# Patient Record
(demographics unavailable — no encounter records)

---

## 2024-10-31 NOTE — REASON FOR VISIT
[Home] : at home, [unfilled] , at the time of the visit. [Other Location: e.g. Home (Enter Location, City,State)___] : at [unfilled] [Patient] : the patient [FreeTextEntry1] : Medication management for ADHD and anxiety

## 2024-10-31 NOTE — PLAN
[Medication education provided] : Medication education provided. [Rationale for medication choices, possible risks/precautions, benefits, alternative treatment choices, and consequences of non-treatment discussed] : Rationale for medication choices, possible risks/precautions, benefits, alternative treatment choices, and consequences of non-treatment discussed with patient/family/caregiver  [FreeTextEntry4] : Medication management to mitigate sx of ADHD and anxiety. [FreeTextEntry5] : Continue Concerta 27mg daily. RTC 1 month

## 2024-10-31 NOTE — SOCIAL HISTORY
[FreeTextEntry1] : Lives with girlfriend. 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 Infrequent alcohol use No other substances Non-smoker

## 2024-10-31 NOTE — FAMILY HISTORY
[FreeTextEntry1] : Sister is on medication for ADHD. Denies any family hx of cardiovascular disease.

## 2024-10-31 NOTE — HISTORY OF PRESENT ILLNESS
[FreeTextEntry1] : Pt seen in follow-up.  At last visit, in an attempt to optimize his ADHD treatment, we switched to the long-acting form of Ritalin.  Pt has been taking this for over a month, and he says it has been quite successful.  He did not experience any of the GI sx he was concerned about.  He has also noticed better effect and for much longer duration. The benefit lasts through his workday without any falling off.  He is still not sure if the current dose is optimal, but he does not was to make any increases today.   [FreeTextEntry2] : Outpatient treatment starting in 2022 No IPP No suicidal thinking or behavior [FreeTextEntry3] : Prozac - helped with anxiety but not concentration and focus Strattera - helped but cause sexual dysfunction

## 2024-12-04 NOTE — DISCUSSION/SUMMARY
[FreeTextEntry1] : Pt with ADHD and anxiety.  Better effect with extended-release medication but now having some supply issues.

## 2024-12-04 NOTE — REASON FOR VISIT
[Home] : at home, [unfilled] , at the time of the visit. [Other Location: e.g. Home (Enter Location, City,State)___] : at [unfilled] [Patient] : the patient [Other Location: e.g. School (Enter Location, City,State)___] : at [unfilled], at the time of the visit. [FreeTextEntry1] : Medication management for ADHD and anxiety

## 2024-12-04 NOTE — HISTORY OF PRESENT ILLNESS
[FreeTextEntry1] : Pt seen in follow-up.  He unfortunately had some supply issues with his medication the past, month, and just had his refill yesterday.  Pt was on vacation for a week in the interim, and he was able to space out medication to some degree, so he did not go completely without.  Definite difference in functional abilities with the medication.  We discussed alternative plans for medication supply if there are any further disruptions.  Pt thinks he might like to try a higher dose of the Concerta at next visit, to see if results might be improved.  This seems reasonable. [FreeTextEntry2] : Outpatient treatment starting in 2022 No IPP No suicidal thinking or behavior [FreeTextEntry3] : Prozac - helped with anxiety but not concentration and focus Strattera - helped but cause sexual dysfunction

## 2024-12-06 NOTE — HISTORY OF PRESENT ILLNESS
[FreeTextEntry1] : Pt seen in follow-up.   [FreeTextEntry2] : Outpatient treatment starting in 2022 No IPP No suicidal thinking or behavior [FreeTextEntry3] : Prozac - helped with anxiety but not concentration and focus Strattera - helped but cause sexual dysfunction

## 2024-12-30 NOTE — HISTORY OF PRESENT ILLNESS
[FreeTextEntry1] : Pt seen in follow-up.  Pt was able to obtain the medication and has been taking 27mg for the entire month.  This is decidedly helpful, and the long-acting formulation has been much more effective.  It still seems likely, however, that the dose is not fully optimized.  Pt would like to proceed with a dosage increase today, and this seems reasonable.  He is having no side effects at this dose.  Potential risks and benefits discussed.  [FreeTextEntry2] : Outpatient treatment starting in 2022 No IPP No suicidal thinking or behavior [FreeTextEntry3] : Prozac - helped with anxiety but not concentration and focus Strattera - helped but cause sexual dysfunction

## 2024-12-30 NOTE — REASON FOR VISIT
[Other Location: e.g. School (Enter Location, City,State)___] : at [unfilled], at the time of the visit. [Other Location: e.g. Home (Enter Location, City,State)___] : at [unfilled] [Patient] : the patient [FreeTextEntry1] : Medication management for ADHD and anxiety

## 2024-12-30 NOTE — DISCUSSION/SUMMARY
[FreeTextEntry1] : Pt with ADHD and anxiety.  Better effect with extended-release medication but dose needs to be optimized.

## 2024-12-30 NOTE — PLAN
[Medication education provided] : Medication education provided. [Rationale for medication choices, possible risks/precautions, benefits, alternative treatment choices, and consequences of non-treatment discussed] : Rationale for medication choices, possible risks/precautions, benefits, alternative treatment choices, and consequences of non-treatment discussed with patient/family/caregiver  [FreeTextEntry4] : Medication management to mitigate sx of ADHD and anxiety. [FreeTextEntry5] : Increase Concerta to 36mg daily. RTC 1 month

## 2025-01-28 NOTE — SOCIAL HISTORY
[FreeTextEntry1] : Lives with girlfriend. 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 Infrequent alcohol use No other substances Non-smoker
Yes

## 2025-01-28 NOTE — PLAN
[Medication education provided] : Medication education provided. [Rationale for medication choices, possible risks/precautions, benefits, alternative treatment choices, and consequences of non-treatment discussed] : Rationale for medication choices, possible risks/precautions, benefits, alternative treatment choices, and consequences of non-treatment discussed with patient/family/caregiver  [FreeTextEntry4] : Medication management to mitigate sx of ADHD and anxiety. [FreeTextEntry5] : Continue Concerta at 36mg daily. RTC 1 month

## 2025-01-28 NOTE — HISTORY OF PRESENT ILLNESS
[FreeTextEntry1] : Pt seen in follow-up.  At last visit, pt reported much better effect with the Concerta, though it did not seem to be fully optimized.  We raised the dose from 27 to 36mg, and he has been taking that for a month.  He has noticed a significant further improvement.  Better focus and attention.  Able to complete required tasks at work.  Denies any side effects from the increase.  Pt is very satisfied with this does and does not think any further adjustment is needed.  Pt will be moving to [place] in 5 months, and we had initial discussion about transferring his care to a new prescriber.  I suggested he look into an online service, such as [employer].  [FreeTextEntry2] : Outpatient treatment starting in 2022 No IPP No suicidal thinking or behavior [FreeTextEntry3] : Prozac - helped with anxiety but not concentration and focus Strattera - helped but cause sexual dysfunction

## 2025-02-27 NOTE — PHYSICAL EXAM
Patient already has appoint 01/06/20 [Average] : average [Cooperative] : cooperative [Euthymic] : euthymic [Full] : full [Clear] : clear [Linear/Goal Directed] : linear/goal directed [WNL] : within normal limits

## 2025-02-27 NOTE — HISTORY OF PRESENT ILLNESS
[FreeTextEntry1] : Pt seen in follow-up.   At last visit, we seemed to have hit the sweet spot for optimal effect from the Concerta.  Pt says this has continued.  Sx relief is very good, and he is functioning well at the hospital.  He denies any side effects.  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  Affect is full.  No significant anxiety sx. [FreeTextEntry2] : Outpatient treatment starting in 2022 No IPP No suicidal thinking or behavior [FreeTextEntry3] : Prozac - helped with anxiety but not concentration and focus Strattera - helped but cause sexual dysfunction

## 2025-04-02 NOTE — PHYSICAL EXAM
[Average] : average [Cooperative] : cooperative [Euthymic] : euthymic [Full] : full [Clear] : clear [Linear/Goal Directed] : linear/goal directed [WNL] : within normal limits [1 - Normal] : 1 - Normal  (not at all ill, symptoms of disorder not present past seven days) [1 - Very much improved] : 1 - Very much improved  (nearly all better; good level of functioning; minimal symptoms; represents a very substantial change)

## 2025-04-02 NOTE — PLAN
[Medication education provided] : Medication education provided. [Rationale for medication choices, possible risks/precautions, benefits, alternative treatment choices, and consequences of non-treatment discussed] : Rationale for medication choices, possible risks/precautions, benefits, alternative treatment choices, and consequences of non-treatment discussed with patient/family/caregiver  [FreeTextEntry4] : Medication management to mitigate sx of ADHD and anxiety. [FreeTextEntry5] : Continue Concerta at 36mg daily. RTC 6 weeks

## 2025-04-02 NOTE — HISTORY OF PRESENT ILLNESS
[FreeTextEntry1] : Pt seen in follow-up.   He has continued to feel well.  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  He just signed a lease for an apartment in [place]. 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  Mood has been good.  He is functioning very well at work with current Concerta dose.  He often skips it on weekend days.  No complaints at this time.  Will continue same. [FreeTextEntry2] : Outpatient treatment starting in 2022 No IPP No suicidal thinking or behavior [FreeTextEntry3] : Prozac - helped with anxiety but not concentration and focus Strattera - helped but cause sexual dysfunction

## 2025-05-14 NOTE — PLAN
[Medication education provided] : Medication education provided. [Rationale for medication choices, possible risks/precautions, benefits, alternative treatment choices, and consequences of non-treatment discussed] : Rationale for medication choices, possible risks/precautions, benefits, alternative treatment choices, and consequences of non-treatment discussed with patient/family/caregiver  [FreeTextEntry4] : Medication management to mitigate sx of ADHD and anxiety. [FreeTextEntry5] : Continue Concerta at 36mg daily. RTC 4 weeks

## 2025-05-14 NOTE — DISCUSSION/SUMMARY
[FreeTextEntry1] : Pt with ADHD and anxiety.  Doing very well with current treatment.  Preparing for move out of state.

## 2025-05-14 NOTE — HISTORY OF PRESENT ILLNESS
[FreeTextEntry1] : Pt seen in follow-up.  He is starting to pack up his apartment.  His last day of work here is 6/19, then he will be moving to [place] to continue his training.  He has Step 3 in a couple of weeks, so he is studying for that.  The medication is helping him to do so.  We will be able to meet once more before he leaves.  He has not yet looked into where he might get treatment in [place], but he plans to contact his  to see if they can provide some guidance.  We also discussed how he can get medical records released, once new treatment is secured.  Mood is good.  No significant anxiety issues.  Good focus and attention. [FreeTextEntry2] : Outpatient treatment starting in 2022 No IPP No suicidal thinking or behavior [FreeTextEntry3] : Prozac - helped with anxiety but not concentration and focus Strattera - helped but cause sexual dysfunction

## 2025-06-11 NOTE — PLAN
[Medication education provided] : Medication education provided. [Rationale for medication choices, possible risks/precautions, benefits, alternative treatment choices, and consequences of non-treatment discussed] : Rationale for medication choices, possible risks/precautions, benefits, alternative treatment choices, and consequences of non-treatment discussed with patient/family/caregiver  [FreeTextEntry4] : Medication management to mitigate sx of ADHD and anxiety. [FreeTextEntry5] : Continue Concerta at 36mg daily. Chart will be closed

## 2025-06-19 NOTE — DISCUSSION/SUMMARY
[FreeTextEntry1] : Patient attended clinic for about 10 months.  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  During his time in treatment, we were able to make several changes to his stimulant regimen that resulted in Markedly improved benefit - both in degree and duration.  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  He intends to find new treatment after he moves, once his new insurance is in place and he has located the appropriate provider.  A one-month supply of medication was given to help bridge that gap.

## 2025-06-19 NOTE — HISTORY OF PRESENT ILLNESS
[FreeTextEntry1] : I wrote the following at the time of admission to the clinic:  "Pt is 28 yo male. seeking psychiatric services primarily for ADHD. Pt brought in records from his most recent treatment, and those were reviewed with him. 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 Pt had been taking Ritalin for newly diagnosed ADHD for the past 5 months or so. He has been stretching his dwindling supply and he is seeking a new prescriber here in [place]. Pt feels the medication has been effective, though he only takes one dose of immediate release in the morning, which seems unlikely to be an optimal schedule. Pt says he has had attention problems since he was a child, but he did well enough in school that, it was never raised as a possible problem. His parents also were not very knowledgeable or understanding regarding psychiatric issues. As the difficulty of pt's education increased in college and then medical school, he became much more aware of the sx and how they were affecting his performance. He became increasingly anxious about being able to get the work done, and this is what finally led him to get an evaluation in 2022. The anxiety was initially thought to be primary, and he was started on Prozac. He did become less anxious with this medication, but there was no improvement in his focus. He was then switched to Strattera, which provided some benefit with focus, but it was stopped due to persisting sexual side effects. He was then switched to the Ritalin, and the effect was superior to the Strattera - much better focus, ability to complete tasks and meet deadlines, etc. He denies having any side effects with it, aside from a slight decrease in appetite. Pt notes that the anxiety diminished significantly with the Ritalin, so it is not clear if the anxiety issue actually requires specific targeted treatment as well. He currently takes Hydroxyzine as a prn, but rarely and mostly for sleep. Current functional demands of internship are obviously quite high, and potentially quite anxiety-provoking - even if the ADHD is well-treated, so it is not clear if current regimen will suffice going forward. For today, we will continue it, but back to daily use rather than stretching it. We get a better idea at next visit if any adjustment will need to be made. Pt was agreeable to this approach."   [FreeTextEntry2] : Outpatient treatment starting in 2022 No IPP No suicidal thinking or behavior.   [FreeTextEntry3] : Prozac - helped with anxiety but not concentration and focus Strattera - helped but cause sexual dysfunction.

## 2025-06-19 NOTE — HISTORY OF PRESENT ILLNESS
[FreeTextEntry1] : I wrote the following at the time of admission to the clinic:  "Pt is 30 yo male. seeking psychiatric services primarily for ADHD. Pt brought in records from his most recent treatment, and those were reviewed with him. 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 Pt had been taking Ritalin for newly diagnosed ADHD for the past 5 months or so. He has been stretching his dwindling supply and he is seeking a new prescriber here in [place]. Pt feels the medication has been effective, though he only takes one dose of immediate release in the morning, which seems unlikely to be an optimal schedule. Pt says he has had attention problems since he was a child, but he did well enough in school that, it was never raised as a possible problem. His parents also were not very knowledgeable or understanding regarding psychiatric issues. As the difficulty of pt's education increased in college and then medical school, he became much more aware of the sx and how they were affecting his performance. He became increasingly anxious about being able to get the work done, and this is what finally led him to get an evaluation in 2022. The anxiety was initially thought to be primary, and he was started on Prozac. He did become less anxious with this medication, but there was no improvement in his focus. He was then switched to Strattera, which provided some benefit with focus, but it was stopped due to persisting sexual side effects. He was then switched to the Ritalin, and the effect was superior to the Strattera - much better focus, ability to complete tasks and meet deadlines, etc. He denies having any side effects with it, aside from a slight decrease in appetite. Pt notes that the anxiety diminished significantly with the Ritalin, so it is not clear if the anxiety issue actually requires specific targeted treatment as well. He currently takes Hydroxyzine as a prn, but rarely and mostly for sleep. Current functional demands of internship are obviously quite high, and potentially quite anxiety-provoking - even if the ADHD is well-treated, so it is not clear if current regimen will suffice going forward. For today, we will continue it, but back to daily use rather than stretching it. We get a better idea at next visit if any adjustment will need to be made. Pt was agreeable to this approach."   [FreeTextEntry2] : Outpatient treatment starting in 2022 No IPP No suicidal thinking or behavior.   [FreeTextEntry3] : Prozac - helped with anxiety but not concentration and focus Strattera - helped but cause sexual dysfunction.

## 2025-06-19 NOTE — REASON FOR VISIT
[Other Location: e.g. School (Enter Location, City,State)___] : at [unfilled], at the time of the visit. [Other Location: e.g. Home (Enter Location, City,State)___] : at [unfilled] [Patient] : the patient [FreeTextEntry1] : Medication management for ADHD and anxiety [Patient relocated] : Patient relocated [FreeTextEntry9] : 8/20/24 [FreeTextEntry8] : 6/19/25 [FreeTextEntry2] : ADHD and anxiety

## 2025-06-19 NOTE — DISCUSSION/SUMMARY
[FreeTextEntry1] : Patient attended clinic for about 10 months.  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  During his time in treatment, we were able to make several changes to his stimulant regimen that resulted in Markedly improved benefit - both in degree and duration.  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  He intends to find new treatment after he moves, once his new insurance is in place and he has located the appropriate provider.  A one-month supply of medication was given to help bridge that gap.